# Patient Record
Sex: FEMALE | Race: WHITE | Employment: FULL TIME | URBAN - METROPOLITAN AREA
[De-identification: names, ages, dates, MRNs, and addresses within clinical notes are randomized per-mention and may not be internally consistent; named-entity substitution may affect disease eponyms.]

---

## 2018-04-10 PROCEDURE — 87624 HPV HI-RISK TYP POOLED RSLT: CPT | Performed by: OBSTETRICS & GYNECOLOGY

## 2018-04-10 PROCEDURE — 88175 CYTOPATH C/V AUTO FLUID REDO: CPT | Performed by: OBSTETRICS & GYNECOLOGY

## 2018-04-20 PROBLEM — L65.9 HAIR LOSS: Status: ACTIVE | Noted: 2018-04-20

## 2019-09-04 PROBLEM — Z82.49 FAMILY HISTORY OF EARLY CAD: Status: ACTIVE | Noted: 2019-09-04

## 2021-07-20 PROBLEM — L65.8 FEMALE PATTERN HAIR LOSS: Status: ACTIVE | Noted: 2021-07-20

## 2025-02-25 ENCOUNTER — APPOINTMENT (OUTPATIENT)
Dept: CT IMAGING | Facility: HOSPITAL | Age: 50
End: 2025-02-25
Attending: EMERGENCY MEDICINE
Payer: COMMERCIAL

## 2025-02-25 ENCOUNTER — APPOINTMENT (OUTPATIENT)
Dept: MRI IMAGING | Facility: HOSPITAL | Age: 50
End: 2025-02-25
Attending: EMERGENCY MEDICINE
Payer: COMMERCIAL

## 2025-02-25 ENCOUNTER — HOSPITAL ENCOUNTER (OUTPATIENT)
Facility: HOSPITAL | Age: 50
Setting detail: OBSERVATION
Discharge: HOME | End: 2025-02-26
Attending: EMERGENCY MEDICINE | Admitting: INTERNAL MEDICINE
Payer: COMMERCIAL

## 2025-02-25 DIAGNOSIS — R42 VERTIGO: Primary | ICD-10-CM

## 2025-02-25 PROBLEM — E87.1 HYPONATREMIA: Status: ACTIVE | Noted: 2025-02-25

## 2025-02-25 PROBLEM — E87.6 HYPOKALEMIA: Status: ACTIVE | Noted: 2025-02-25

## 2025-02-25 PROBLEM — R73.9 HYPERGLYCEMIA: Status: ACTIVE | Noted: 2025-02-25

## 2025-02-25 LAB
ALBUMIN SERPL-MCNC: 4.4 G/DL (ref 3.2–4.8)
ALBUMIN/GLOB SERPL: 1.8 {RATIO} (ref 1–2)
ALP LIVER SERPL-CCNC: 35 U/L
ALT SERPL-CCNC: 14 U/L
ANION GAP SERPL CALC-SCNC: 8 MMOL/L (ref 0–18)
AST SERPL-CCNC: 22 U/L (ref ?–34)
ATRIAL RATE: 69 BPM
BASOPHILS # BLD AUTO: 0.04 X10(3) UL (ref 0–0.2)
BASOPHILS NFR BLD AUTO: 0.4 %
BILIRUB SERPL-MCNC: 0.8 MG/DL (ref 0.3–1.2)
BUN BLD-MCNC: 14 MG/DL (ref 9–23)
CALCIUM BLD-MCNC: 9 MG/DL (ref 8.7–10.6)
CHLORIDE SERPL-SCNC: 102 MMOL/L (ref 98–112)
CO2 SERPL-SCNC: 23 MMOL/L (ref 21–32)
CREAT BLD-MCNC: 0.72 MG/DL
EGFRCR SERPLBLD CKD-EPI 2021: 103 ML/MIN/1.73M2 (ref 60–?)
EOSINOPHIL # BLD AUTO: 0.01 X10(3) UL (ref 0–0.7)
EOSINOPHIL NFR BLD AUTO: 0.1 %
ERYTHROCYTE [DISTWIDTH] IN BLOOD BY AUTOMATED COUNT: 11.7 %
GLOBULIN PLAS-MCNC: 2.4 G/DL (ref 2–3.5)
GLUCOSE BLD-MCNC: 171 MG/DL (ref 70–99)
GLUCOSE BLD-MCNC: 178 MG/DL (ref 70–99)
HCT VFR BLD AUTO: 38.2 %
HGB BLD-MCNC: 13.9 G/DL
IMM GRANULOCYTES # BLD AUTO: 0.02 X10(3) UL (ref 0–1)
IMM GRANULOCYTES NFR BLD: 0.2 %
LYMPHOCYTES # BLD AUTO: 1.78 X10(3) UL (ref 1–4)
LYMPHOCYTES NFR BLD AUTO: 17.7 %
MCH RBC QN AUTO: 31.7 PG (ref 26–34)
MCHC RBC AUTO-ENTMCNC: 36.4 G/DL (ref 31–37)
MCV RBC AUTO: 87.2 FL
MONOCYTES # BLD AUTO: 0.3 X10(3) UL (ref 0.1–1)
MONOCYTES NFR BLD AUTO: 3 %
NEUTROPHILS # BLD AUTO: 7.88 X10 (3) UL (ref 1.5–7.7)
NEUTROPHILS # BLD AUTO: 7.88 X10(3) UL (ref 1.5–7.7)
NEUTROPHILS NFR BLD AUTO: 78.6 %
OSMOLALITY SERPL CALC.SUM OF ELEC: 281 MOSM/KG (ref 275–295)
P AXIS: 65 DEGREES
P-R INTERVAL: 130 MS
PLATELET # BLD AUTO: 277 10(3)UL (ref 150–450)
POTASSIUM SERPL-SCNC: 3.5 MMOL/L (ref 3.5–5.1)
PROT SERPL-MCNC: 6.8 G/DL (ref 5.7–8.2)
Q-T INTERVAL: 420 MS
QRS DURATION: 86 MS
QTC CALCULATION (BEZET): 450 MS
R AXIS: 63 DEGREES
RBC # BLD AUTO: 4.38 X10(6)UL
SODIUM SERPL-SCNC: 133 MMOL/L (ref 136–145)
T AXIS: 52 DEGREES
VENTRICULAR RATE: 69 BPM
WBC # BLD AUTO: 10 X10(3) UL (ref 4–11)

## 2025-02-25 PROCEDURE — 70498 CT ANGIOGRAPHY NECK: CPT | Performed by: EMERGENCY MEDICINE

## 2025-02-25 PROCEDURE — 70496 CT ANGIOGRAPHY HEAD: CPT | Performed by: EMERGENCY MEDICINE

## 2025-02-25 PROCEDURE — 70553 MRI BRAIN STEM W/O & W/DYE: CPT | Performed by: EMERGENCY MEDICINE

## 2025-02-25 RX ORDER — DIPHENHYDRAMINE HYDROCHLORIDE 50 MG/ML
10 INJECTION, SOLUTION INTRAMUSCULAR; INTRAVENOUS
Status: COMPLETED | OUTPATIENT
Start: 2025-02-25 | End: 2025-02-25

## 2025-02-25 RX ORDER — DOXEPIN HYDROCHLORIDE 50 MG/1
1 CAPSULE ORAL NIGHTLY
Status: DISCONTINUED | OUTPATIENT
Start: 2025-02-25 | End: 2025-02-26

## 2025-02-25 RX ORDER — ONDANSETRON 2 MG/ML
4 INJECTION INTRAMUSCULAR; INTRAVENOUS EVERY 6 HOURS PRN
Status: DISCONTINUED | OUTPATIENT
Start: 2025-02-25 | End: 2025-02-26

## 2025-02-25 RX ORDER — SODIUM CHLORIDE 9 MG/ML
INJECTION, SOLUTION INTRAVENOUS CONTINUOUS
Status: DISCONTINUED | OUTPATIENT
Start: 2025-02-25 | End: 2025-02-26

## 2025-02-25 RX ORDER — TRETINOIN 1 MG/G
CREAM TOPICAL NIGHTLY
COMMUNITY

## 2025-02-25 RX ORDER — ACETAMINOPHEN 325 MG/1
650 TABLET ORAL EVERY 4 HOURS PRN
Status: DISCONTINUED | OUTPATIENT
Start: 2025-02-25 | End: 2025-02-26

## 2025-02-25 RX ORDER — MECLIZINE HYDROCHLORIDE 25 MG/1
25 TABLET ORAL ONCE
Status: COMPLETED | OUTPATIENT
Start: 2025-02-25 | End: 2025-02-25

## 2025-02-25 RX ORDER — METOCLOPRAMIDE HYDROCHLORIDE 5 MG/ML
10 INJECTION INTRAMUSCULAR; INTRAVENOUS ONCE
Status: COMPLETED | OUTPATIENT
Start: 2025-02-25 | End: 2025-02-25

## 2025-02-25 RX ORDER — ALBUTEROL SULFATE 90 UG/1
2 INHALANT RESPIRATORY (INHALATION) EVERY 4 HOURS PRN
COMMUNITY

## 2025-02-25 RX ORDER — DIAZEPAM 10 MG/2ML
5 INJECTION, SOLUTION INTRAMUSCULAR; INTRAVENOUS ONCE
Status: COMPLETED | OUTPATIENT
Start: 2025-02-25 | End: 2025-02-25

## 2025-02-25 RX ORDER — HYDROCODONE BITARTRATE AND ACETAMINOPHEN 5; 325 MG/1; MG/1
1 TABLET ORAL EVERY 4 HOURS PRN
COMMUNITY
Start: 2025-02-12

## 2025-02-25 RX ORDER — PROCHLORPERAZINE EDISYLATE 5 MG/ML
5 INJECTION INTRAMUSCULAR; INTRAVENOUS EVERY 8 HOURS PRN
Status: DISCONTINUED | OUTPATIENT
Start: 2025-02-25 | End: 2025-02-26

## 2025-02-25 RX ORDER — ACETAMINOPHEN 500 MG
500 TABLET ORAL EVERY 4 HOURS PRN
Status: DISCONTINUED | OUTPATIENT
Start: 2025-02-25 | End: 2025-02-26

## 2025-02-25 RX ORDER — DIAZEPAM 10 MG/2ML
5 INJECTION, SOLUTION INTRAMUSCULAR; INTRAVENOUS EVERY 6 HOURS PRN
Status: DISCONTINUED | OUTPATIENT
Start: 2025-02-25 | End: 2025-02-26

## 2025-02-25 RX ORDER — ASCORBIC ACID 500 MG
500 TABLET ORAL NIGHTLY
COMMUNITY

## 2025-02-25 RX ORDER — MECLIZINE HCL 12.5 MG 12.5 MG/1
25 TABLET ORAL 3 TIMES DAILY PRN
Status: DISCONTINUED | OUTPATIENT
Start: 2025-02-25 | End: 2025-02-26

## 2025-02-25 RX ORDER — ENOXAPARIN SODIUM 100 MG/ML
40 INJECTION SUBCUTANEOUS NIGHTLY
Status: DISCONTINUED | OUTPATIENT
Start: 2025-02-25 | End: 2025-02-26

## 2025-02-25 RX ORDER — SPIRONOLACTONE 25 MG/1
100 TABLET ORAL DAILY
Status: DISCONTINUED | OUTPATIENT
Start: 2025-02-26 | End: 2025-02-26

## 2025-02-25 RX ORDER — HYDROCODONE BITARTRATE AND ACETAMINOPHEN 5; 325 MG/1; MG/1
2 TABLET ORAL EVERY 4 HOURS PRN
Status: DISCONTINUED | OUTPATIENT
Start: 2025-02-25 | End: 2025-02-26

## 2025-02-25 RX ORDER — MINOXIDIL 2.5 MG/1
TABLET ORAL
COMMUNITY
Start: 2024-10-08

## 2025-02-25 RX ORDER — DIPHENHYDRAMINE HYDROCHLORIDE 50 MG/ML
25 INJECTION INTRAMUSCULAR; INTRAVENOUS ONCE
Status: COMPLETED | OUTPATIENT
Start: 2025-02-25 | End: 2025-02-25

## 2025-02-25 RX ORDER — HYDROCODONE BITARTRATE AND ACETAMINOPHEN 5; 325 MG/1; MG/1
1 TABLET ORAL EVERY 4 HOURS PRN
Status: DISCONTINUED | OUTPATIENT
Start: 2025-02-25 | End: 2025-02-26

## 2025-02-25 RX ORDER — ACETAMINOPHEN 500 MG
1000 TABLET ORAL EVERY 8 HOURS PRN
COMMUNITY

## 2025-02-25 NOTE — ED QUICK NOTES
Orders for admission, patient is aware of plan and ready to go upstairs. Any questions, please call ED MONI Crespo at extension [phone].     Patient Covid vaccination status: Fully vaccinated     COVID Test Ordered in ED: None    COVID Suspicion at Admission: N/A    Running Infusions:  None    Mental Status/LOC at time of transport: A&O x4    Other pertinent information:   CIWA score: N/A   NIH score:  0

## 2025-02-25 NOTE — ED PROVIDER NOTES
Patient Seen in: [facility]      History     Chief Complaint   Patient presents with    Dizziness     Stated Complaint: dizzy since 0900 yesterday    Subjective:   HPI      Patient presents with dizziness.  The patient first felt dizzy at 9 AM yesterday.  She was in the bathroom urinating.  It felt like things were spinning but it resolved quickly.  She was fine for the remainder of the day.  However overnight when she got up to go to the bathroom she felt like things were spinning again.  When she woke up this morning she could not get out of bed.  Her mother had vertigo and her  went to pick her up meclizine to try.  She took that without improvement.  She continued to vomit and cannot open her eyes or move at all without severe dizziness.  She does not have a headache.  She was brought in by EMS because her  could not get her into the car.  They report giving her Zofran with slight improvement in the nausea.  She denies any focal weakness or numbness.  She has not had any URI symptoms or ear pain.  She did have recent foot surgery but has done well postoperatively.  She is not having any swelling in the leg and her foot swelling is down.  She did follow-up with her podiatrist to get the sutures removed.    Objective:     Past Medical History:    Perimenopause              Past Surgical History:   Procedure Laterality Date    Excisional biospy right  2011    benign    Foot surgery Right 02/12/2025    \"fixed some loose cartilage\"    Other surgical history  12/15/2011    R Breast Lumpectomy - benign                Social History     Socioeconomic History    Marital status:    Tobacco Use    Smoking status: Never    Smokeless tobacco: Never   Substance and Sexual Activity    Alcohol use: Yes     Alcohol/week: 0.0 standard drinks of alcohol     Comment: 1/week    Drug use: No    Sexual activity: Yes     Partners: Male     Birth control/protection: Pill                   Physical Exam     ED Triage Vitals   BP 02/25/25 1130 118/69   Pulse 02/25/25 1130 69   Resp 02/25/25 1130 16   Temp 02/25/25 1136 98.4 °F (36.9 °C)   Temp src 02/25/25 1136 Oral   SpO2 02/25/25 1130 100 %   O2 Device 02/25/25 1230 None (Room air)       Current Vitals:   Vital Signs  BP: 101/54  Pulse: 80  Resp: 16  Temp: 98.4 °F (36.9 °C)  Temp src: Oral  MAP (mmHg): 69    Oxygen Therapy  SpO2: 100 %  O2 Device: None (Room air)        Physical Exam  General: Lying with eyes closed, answers questions appropriately.  HEENT: Normocephalic, atraumatic, pupils equal round and reactive to light, moderate horizontal nystagmus noted, TMs clear bilaterally.  Neck: Supple.  Cardiovascular: Regular rate and rhythm, no murmurs.  Respiratory: Lungs clear to auscultation.  Extremities: Dressing intact to right foot, no edema in the extremities.  Skin: Warm and dry.  Neurologic: Cranial nerves intact.  Strength 5/5 in all extremities.  Sensory exam grossly intact.    ED Course     Labs Reviewed   CBC WITH DIFFERENTIAL WITH PLATELET - Abnormal; Notable for the following components:       Result Value    Neutrophil Absolute Prelim 7.88 (*)     Neutrophil Absolute 7.88 (*)     All other components within normal limits   COMP METABOLIC PANEL (14) - Abnormal; Notable for the following components:    Glucose 171 (*)     Sodium 133 (*)     Alkaline Phosphatase 35 (*)     All other components within normal limits   POCT GLUCOSE - Abnormal; Notable for the following components:    POC Glucose 178 (*)     All other components within normal limits   RAINBOW DRAW LAVENDER   RAINBOW DRAW LIGHT GREEN   RAINBOW DRAW BLUE     EKG    Rate, intervals and axes as noted on EKG Report.  Rate: 69  Rhythm: Sinus Rhythm  Reading: No acute ischemic abnormality            CTA brain and neck: Pending  MRI brain: Pending    Medications   metoclopramide (Reglan) 5 mg/mL injection 10 mg (10 mg Intravenous Given 2/25/25 1227)   diphenhydrAMINE  (Benadryl) 50 mg/mL  injection 25 mg (25 mg Intravenous Given 2/25/25 1227)   sodium chloride 0.9 % IV bolus 1,000 mL (0 mL Intravenous Stopped 2/25/25 1342)   diazepam (Valium) 5 mg/mL injection 5 mg (5 mg Intravenous Given 2/25/25 1227)   meclizine (Antivert) tab 25 mg (25 mg Oral Given 2/25/25 1342)     The patient was given multiple medications listed above for her dizziness and nausea.  She has improvement in the nausea and some improvement in the dizziness but it is still moderate in severity.  She was unable to get up and ambulate with a steady gait.     MDM      Patient presents with vertigo.  Differential diagnosis includes but is not limited to benign positional vertigo, acute stroke and vertebral artery dissection.  The patient has persistent vertigo symptoms despite multiple medications.  She is still not able to ambulate with a steady gait.  I have discussed her case with Dr. Salcido from neurology.  We will proceed with CT angiogram of the head and neck to rule out vertebral artery dissection/occlusion.  I have an ordered an MRI as well to evaluate for stroke.  The patient will be admitted to Dr. English from the hospitalist service and we have discussed the case as well.    Admission disposition: 2/25/2025  2:40 PM           Medical Decision Making      Disposition and Plan     Clinical Impression:  1. Vertigo         Disposition:  Admit  2/25/2025  2:40 pm    Follow-up:  No follow-up provider specified.        Medications Prescribed:  Current Discharge Medication List              Supplementary Documentation:         Hospital Problems       Present on Admission  Date Reviewed: 10/21/2021            ICD-10-CM Noted POA    * (Principal) Vertigo R42 2/25/2025 Unknown    Hyperglycemia R73.9 2/25/2025 Yes    Hypokalemia E87.6 2/25/2025 Yes    Hyponatremia E87.1 2/25/2025 Yes

## 2025-02-25 NOTE — ED PROVIDER NOTES
Patient was signed out to follow-up on the CTA of the head and neck.      CTA BRAIN + CTA CAROTIDS (CPT=70496/05123)    Result Date: 2/25/2025  PROCEDURE:  CTA BRAIN + CTA CAROTIDS (CPT=70496/24767)  COMPARISON:  None.  INDICATIONS:  dizzy since 0900 yesterday  TECHNIQUE:  CT angiography of the head and neck were obtained with non-ionic contrast.  3D volume rendering images were obtained by the technologist as well as the radiologist on an independent workstation.  Multiplanar 3D reformatted imaging including multiplanar MIP images were obtained.  Curved planar reformats were performed through the carotid and vertebral arteries. All measurements obtained in this exam were performed using NASCET criteria.  Dose reduction techniques were used. Dose information is transmitted to the ACR (American College of Radiology) NRDR (National Radiology Data Registry) which includes the Dose Index Registry.  PATIENT STATED HISTORY:(As transcribed by Technologist)  Patient presents with dizziness and nausea since 9am yesterday.   CONTRAST USED:  75cc of Isovue 370  FINDINGS:  Ventricles and sulci are within normal limits.  There is no midline shift or mass-effect.  The basal cisterns are patent.  The gray-white matter differentiation is intact.  There is no acute intracranial hemorrhage or extra-axial fluid collection.  Mild lucencies in the white matter.  There is no evident fracture.  The visualized paranasal sinuses and mastoid air cells are unremarkable.  The upper cervical, petrous, cavernous, and supraclinoid internal carotid arteries are unremarkable.  An anterior communicating artery is seen.  The branches of the anterior cerebral and middle cerebral arteries are unremarkable.   Infundibulum for the left posterior communicating artery is noted.  The branches of the posterior cerebral and superior cerebellar arteries are unremarkable.  The basilar artery has a normal course and caliber.  The bilateral vertebral arteries are  unremarkable.  Takeoff of right PICA is noted.  There is a 3-vessel aortic arch.  The origins of the branch vessels appear widely patent.  The bilateral subclavian arteries and innominate artery are unremarkable.  The common carotid arteries are widely patent.  The carotid bifurcations appear unremarkable.  There is no evidence of hemodynamically significant stenosis in the carotid bulbs by NASCET criteria.  The cervical internal carotid arteries are widely patent.  Mild patulous prominence of the distal cervical segment of bilateral internal carotid arteries measuring 6 mm in the right and 6 mm in left is noted.  This is of questionable significance.  The vertebral arteries originate from the subclavian arteries.  The origins of the vertebral arteries are patent.  The cervical vertebral arteries are widely patent.              CONCLUSION:   1. Unremarkable CT angiogram head and neck examination.  2. No significant stenosis is noted.  Mild patulous prominence of the distal cervical segments of bilateral internal carotid arteries is of questionable significance.   LOCATION:  [facility]   Dictated by (CST): Shmuel Muñiz MD on 2/25/2025 at 4:38 PM     Finalized by (CST): Shmuel Muñiz MD on 2/25/2025 at 4:44 PM

## 2025-02-25 NOTE — H&P
[facility]  HOSPITALIST  History and Physical     Noelle Barajas Patient Status:  Emergency    1975 MRN [id number]   [facility] EMERGENCY DEPARTMENT Attending Susanna Gaston MD   Hosp Day # 0 PCP Max Cardenas MD     Chief Complaint:   vertigo    History of Present Illness: Noelle Barajas is a 49 year old female with PMH sig for perimenopause - presented to the ED for dizziness. States it started yest at 9 am. She was in the bathroom when it started. Resolved quickly. Overnight it came back. They tried meclizine with no improvement. Was nauseous and vomiting due to dizziness. Having trouble opening her eyes due to dizziness. No FND on EXAM. She had recent food surgery but has been doing well post op. Sodium 133.  CT brain and carotid pending. MRI brain pending. Neuro consulted. Got reglan, valium, benadryl, meclizine in ED with minimal improvement. Admitting for further care and management.    Past Medical History:  Past Medical History:    Perimenopause        Past Surgical History:   Past Surgical History:   Procedure Laterality Date    Excisional biospy right      benign    Foot surgery Right 2025    \"fixed some loose cartilage\"    Other surgical history  12/15/2011    R Breast Lumpectomy - benign       Social History:  reports that she has never smoked. She has never used smokeless tobacco. She reports current alcohol use. She reports that she does not use drugs.    Family History:   Family History   Problem Relation Age of Onset    Hypertension Father     Lipids Father     Heart Disorder Father         CAD - smoker    Hypertension Mother     Lipids Mother     Cancer Maternal Grandmother         cervical cancer    Heart Disorder Maternal Grandfather         CAD    Heart Disorder Paternal Grandfather         CAD        Allergies: Allergies[1]    Medications:  Medications Ordered Prior to Encounter[2]    Review of Systems:   A comprehensive 14 point review of systems  was completed.    Pertinent positives and negatives noted in the HPI.    Physical Exam:    /73   Pulse 80   Temp 98.4 °F (36.9 °C) (Oral)   Resp 16   LMP  (LMP Unknown)   SpO2 100%   General: No acute distress. Alert and oriented x 3.  HEENT: Normocephalic atraumatic. Moist mucous membranes. EOM-I. PERRLA. Anicteric.  Neck: No lymphadenopathy. No JVD. No carotid bruits.  Respiratory: Clear to auscultation bilaterally. No wheezes. No rhonchi.  Cardiovascular: S1, S2. Regular rate and rhythm. No murmurs, rubs or gallops. Equal pulses.   Chest and Back: No tenderness or deformity.  Abdomen: Soft, nontender, nondistended.  Positive bowel sounds. No rebound, guarding or organomegaly.  Neurologic: No focal neurological deficits. CNII-XII grossly intact.  Musculoskeletal: Moves all extremities.  Extremities: No edema or cyanosis.  Integument: No rashes or lesions.   Psychiatric: Appropriate mood and affect.      Diagnostic Data:      Labs:  Recent Labs   Lab 02/25/25  1136   WBC 10.0   HGB 13.9   MCV 87.2   .0       Recent Labs   Lab 02/25/25  1135   *   BUN 14   CREATSERUM 0.72   CA 9.0   ALB 4.4   *   K 3.5      CO2 23.0   ALKPHO 35*   AST 22   ALT 14   BILT 0.8   TP 6.8       CrCl cannot be calculated (Unknown ideal weight.).    No results for input(s): \"PTP\", \"INR\" in the last 168 hours.    COVID-19 Lab Results    COVID-19  No results found for: \"COVID19\"    Pro-Calcitonin  No results for input(s): \"PCT\" in the last 168 hours.    Cardiac  No results for input(s): \"TROP\", \"PBNP\" in the last 168 hours.    Creatinine Kinase  No results for input(s): \"CK\" in the last 168 hours.    Inflammatory Markers  No results for input(s): \"CRP\", \"CHARLENE\", \"LDH\", \"DDIMER\" in the last 168 hours.    No results for input(s): \"TROP\", \"TROPHS\", \"CK\" in the last 168 hours.    Imaging: Imaging data reviewed in Epic.      ASSESSMENT / PLAN:    Noelle CONCEPCION Barajas is a 49 year old female with PMH sig for  perimenopause - presented to the ED for dizziness.     Dizziness - likely 2/2 BPPV  -pt feels somewhat better now  -meclizine prn  -valium prn  -supportive care  -IVF  -CTA reviewed and normal  -MRI brain pending  -PT consult for vestibular therapy  -neuro consulted >> apprec recs  -repeat labs in the morning        Quality:  DVT Prophylaxis: lovenox, scds  CODE status: full code  Rosas: no  If COVID testing is negative, may discontinue isolation: yes     Plan of care discussed with patient and all questions answered.      Ninoska Ferguson Hospitalist  Pager [phone]  Answering Service number: [phone]                            [1]   Allergies  Allergen Reactions    Augmentin [Amoxicillin-Pot Clavulanate] DIARRHEA and NAUSEA ONLY   [2]   No current facility-administered medications on file prior to encounter.     Current Outpatient Medications on File Prior to Encounter   Medication Sig Dispense Refill    minoxidil 2.5 MG Oral Tab Take 1/2 tab po every other day, increase to daily as tolerated. (Patient taking differently: Take 1 tablet (2.5 mg total) by mouth every other day.)      acetaminophen 500 MG Oral Tab Take 2 tablets (1,000 mg total) by mouth every 8 (eight) hours as needed for Pain.      Carboxymethylcellulose Sodium (REFRESH TEARS OP) Place 1 drop into both eyes at bedtime.      Vitamin C 500 MG Oral Tab Take 1 tablet (500 mg total) by mouth at bedtime.      Calcium Carbonate-Vitamin D (CALCIUM 600 + D OR) Take 1 tablet by mouth at bedtime.      ZINC OR Take 1 tablet by mouth at bedtime.      MISC NATURAL PRODUCTS OR Take 1 Scoop by mouth at bedtime. Wheat Grass      Multiple Vitamins-Minerals (ONE-A-DAY WOMENS OR) Take 1 tablet by mouth at bedtime.      Tretinoin 0.1 % External Cream Apply topically nightly.      MISC NATURAL PRODUCTS OR Take 4 capsules by mouth daily. Nutrafol Women      Norethindrone Acet-Ethinyl Est 1-20 MG-MCG Oral Tab Take 1 tablet by mouth daily. One po q day  continuously 112 tablet 4    HYDROcodone-acetaminophen 5-325 MG Oral Tab Take 1 tablet by mouth every 4 (four) hours as needed.      albuterol 108 (90 Base) MCG/ACT Inhalation Aero Soln Inhale 2 puffs into the lungs every 4 (four) hours as needed for Wheezing.      spironolactone 100 MG Oral Tab TAKE 1 TABLET BY MOUTH EVERY DAY 90 tablet 0

## 2025-02-26 VITALS
TEMPERATURE: 98 F | OXYGEN SATURATION: 96 % | HEART RATE: 80 BPM | SYSTOLIC BLOOD PRESSURE: 114 MMHG | BODY MASS INDEX: 20.07 KG/M2 | DIASTOLIC BLOOD PRESSURE: 83 MMHG | WEIGHT: 127.88 LBS | RESPIRATION RATE: 16 BRPM | HEIGHT: 67.01 IN

## 2025-02-26 LAB
ANION GAP SERPL CALC-SCNC: 9 MMOL/L (ref 0–18)
BASOPHILS # BLD AUTO: 0.05 X10(3) UL (ref 0–0.2)
BASOPHILS NFR BLD AUTO: 0.7 %
BUN BLD-MCNC: 10 MG/DL (ref 9–23)
CALCIUM BLD-MCNC: 9.3 MG/DL (ref 8.7–10.6)
CHLORIDE SERPL-SCNC: 107 MMOL/L (ref 98–112)
CO2 SERPL-SCNC: 26 MMOL/L (ref 21–32)
CREAT BLD-MCNC: 0.7 MG/DL
EGFRCR SERPLBLD CKD-EPI 2021: 107 ML/MIN/1.73M2 (ref 60–?)
EOSINOPHIL # BLD AUTO: 0.09 X10(3) UL (ref 0–0.7)
EOSINOPHIL NFR BLD AUTO: 1.2 %
ERYTHROCYTE [DISTWIDTH] IN BLOOD BY AUTOMATED COUNT: 11.9 %
GLUCOSE BLD-MCNC: 94 MG/DL (ref 70–99)
HCT VFR BLD AUTO: 36.7 %
HGB BLD-MCNC: 13 G/DL
IMM GRANULOCYTES # BLD AUTO: 0.03 X10(3) UL (ref 0–1)
IMM GRANULOCYTES NFR BLD: 0.4 %
LYMPHOCYTES # BLD AUTO: 2.18 X10(3) UL (ref 1–4)
LYMPHOCYTES NFR BLD AUTO: 29.7 %
MCH RBC QN AUTO: 32.3 PG (ref 26–34)
MCHC RBC AUTO-ENTMCNC: 35.4 G/DL (ref 31–37)
MCV RBC AUTO: 91.1 FL
MONOCYTES # BLD AUTO: 0.37 X10(3) UL (ref 0.1–1)
MONOCYTES NFR BLD AUTO: 5 %
NEUTROPHILS # BLD AUTO: 4.61 X10 (3) UL (ref 1.5–7.7)
NEUTROPHILS # BLD AUTO: 4.61 X10(3) UL (ref 1.5–7.7)
NEUTROPHILS NFR BLD AUTO: 63 %
OSMOLALITY SERPL CALC.SUM OF ELEC: 293 MOSM/KG (ref 275–295)
PLATELET # BLD AUTO: 251 10(3)UL (ref 150–450)
POTASSIUM SERPL-SCNC: 4.2 MMOL/L (ref 3.5–5.1)
RBC # BLD AUTO: 4.03 X10(6)UL
SODIUM SERPL-SCNC: 142 MMOL/L (ref 136–145)
WBC # BLD AUTO: 7.3 X10(3) UL (ref 4–11)

## 2025-02-26 PROCEDURE — 99223 1ST HOSP IP/OBS HIGH 75: CPT | Performed by: OTHER

## 2025-02-26 RX ORDER — MECLIZINE HYDROCHLORIDE 25 MG/1
25 TABLET ORAL 3 TIMES DAILY PRN
Qty: 30 TABLET | Refills: 1 | Status: SHIPPED | OUTPATIENT
Start: 2025-02-26

## 2025-02-26 NOTE — PLAN OF CARE
NURSING DISCHARGE NOTE    Discharged Home via Wheelchair.  Accompanied by RN  Belongings Taken by patient/family.  IV removed  AVS reviewed  All questions answered

## 2025-02-26 NOTE — PLAN OF CARE
Assumed care at 0730  A/Ox4, RA, VSS  Tele, NSR   Denies n/v & pain   Reg diet  PIV R AC, .9 @ 100mL/hr  Updated w/ POC  All needs met at this time     Problem: SAFETY ADULT - FALL  Goal: Free from fall injury  Description: INTERVENTIONS:  - Assess pt frequently for physical needs  - Identify cognitive and physical deficits and behaviors that affect risk of falls.  - [place] fall precautions as indicated by assessment.  - Educate pt/family on patient safety including physical limitations  - Instruct pt to call for assistance with activity based on assessment  - Modify environment to reduce risk of injury  - Provide assistive devices as appropriate  - Consider OT/PT consult to assist with strengthening/mobility  - Encourage toileting schedule  Outcome: Progressing     Problem: Patient/Family Goals  Goal: Patient/Family Long Term Goal  Description: Patient's Long Term Goal: dc home    Interventions:  - neurology consult  - See additional Care Plan goals for specific interventions  Outcome: Progressing  Goal: Patient/Family Short Term Goal  Description: Patient's Short Term Goal:     Interventions:   -   - See additional Care Plan goals for specific interventions  Outcome: Progressing

## 2025-02-26 NOTE — PROGRESS NOTES
[facility]   part of [facility] Hospitalist Progress Note     Noelle Barajas Patient Status:  Observation    1975 MRN [id number]   Location [facility] 3NE-A Attending Ninoska English MD   Hosp Day # 0 PCP Max Cardenas MD     Subjective:     Patient seen and examined.   Imaging reviewed  Feels less dizzy  Feels better  Afebrile      Objective:    Review of Systems:   10 point ROS completed and was negative, except for pertinent positive and negatives stated in subjective.    Vital signs:  Temp:  [97.8 °F (36.6 °C)-98.6 °F (37 °C)] 98.1 °F (36.7 °C)  Pulse:  [69-80] 72  Resp:  [14-20] 16  BP: ()/(54-73) 118/70  SpO2:  [96 %-100 %] 98 %    Physical Exam:    General: No acute distress.   HEENT:  EOMI, PERRLA, OP clear  Respiratory: Clear to auscultation bilaterally. No wheezes. No rhonchi.  Cardiovascular: S1, S2. Regular rate and rhythm. No murmurs.  Abdomen: Soft, nontender, nondistended.  Positive bowel sounds. No rebound or guarding.  Extremities: No edema.  Neuro:  Grossly non focal, no motor deficits.        Diagnostic Data:    Labs:  Recent Labs   Lab 25  1136   WBC 10.0   HGB 13.9   MCV 87.2   .0       Recent Labs   Lab 25  1135   *   BUN 14   CREATSERUM 0.72   CA 9.0   ALB 4.4   *   K 3.5      CO2 23.0   ALKPHO 35*   AST 22   ALT 14   BILT 0.8   TP 6.8       Estimated Creatinine Clearance: 87.5 mL/min (based on SCr of 0.72 mg/dL).    No results for input(s): \"PTP\", \"INR\" in the last 168 hours.         COVID-19 Lab Results    COVID-19  No results found for: \"COVID19\"    Pro-Calcitonin  No results for input(s): \"PCT\" in the last 168 hours.    Cardiac  No results for input(s): \"TROP\", \"PBNP\" in the last 168 hours.    Creatinine Kinase  No results for input(s): \"CK\" in the last 168 hours.    Inflammatory Markers  No results for input(s): \"CRP\", \"CHARLENE\", \"LDH\", \"DDIMER\" in the last 168 hours.    Imaging: Imaging data  reviewed in Epic.    Medications:    enoxaparin  40 mg Subcutaneous Nightly    spironolactone  100 mg Oral Daily    multivitamin  1 tablet Oral Nightly       Assessment & Plan:    Noelle Barajas is a 49 year old female with PMH sig for perimenopause - presented to the ED for dizziness.      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        Ninoska English MD  [facility] Hospitalist  Pager [phone]  Answering Service number: [phone]

## 2025-02-26 NOTE — PROGRESS NOTES
02/26/25 1407 02/26/25 1408 02/26/25 1409   Vitals   /77 132/83 114/83   MAP (mmHg) 92 93 95   BP Location Left arm Left arm Left arm   BP Method Manual Manual Manual   Patient Position Lying Sitting Standing
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
## 2025-02-26 NOTE — PHYSICAL THERAPY NOTE
PHYSICAL THERAPY EVALUATION - INPATIENT     Room Number: 3604/3604-A  Evaluation Date: 2025  Type of Evaluation: Initial  Physician Order: PT Eval and Treat    Presenting Problem: Vertigo  Co-Morbidities : Hyponatermia, Hypokalemia, Hyperglycemia  Reason for Therapy: Mobility Dysfunction and Discharge Planning    PHYSICAL THERAPY ASSESSMENT   Patient is a 49 year old female admitted 2025 for vertigo.  Prior to admission, patient's baseline is IND.  Patient is currently functioning at baseline with bed mobility, transfers, and gait.  Pt has no IP PT goals at this time    Patient will benefit from continued skilled PT Services for duration of hospitalization, however, given the patient is functioning near baseline level do not anticipate skilled therapy needs at discharge .    PLAN DURING HOSPITALIZATION  Nursing Mobility Recommendation : 1 Assist  PT Device Recommendation: None              CURRENT GOALS    Patient has met all skilled IPPT goals at this time. Patient will be discharged from Physical Therapy services.  Please re-order if a new functional limitation presents during this admission.        PHYSICAL THERAPY MEDICAL/SOCIAL HISTORY  History related to current admission: Patient is a 49 year old female admitted on 2025 from Home for Vertigo.      HOME SITUATION  Type of Home: House  Home Layout: Two level           Stairs to Bedroom: 12    Railing: Yes    Lives With: Spouse    Drives: Yes   Patient Regularly Uses: None      Prior Level of [place]: Pt states that she is IND with all ADLs and Gait    SUBJECTIVE  \"I feel better compared to yesterday\"    OBJECTIVE  Precautions: None  Fall Risk: Standard fall risk    WEIGHT BEARING RESTRICTION     PAIN ASSESSMENT  Ratin  Location: Pt reports no pain       COGNITION  Overall Cognitive Status:  WFL - within functional limits    RANGE OF MOTION AND STRENGTH ASSESSMENT  Upper extremity ROM and strength are within functional limits     Lower  extremity ROM is within functional limits     Lower extremity strength is within functional limits     BALANCE  Static Sitting: Normal  Dynamic Sitting: Normal  Static Standing: Normal  Dynamic Standing: Normal    ADDITIONAL TESTS                                    ACTIVITY TOLERANCE                         O2 WALK       NEUROLOGICAL FINDINGS                        AM-PAC '6-Clicks' INPATIENT SHORT FORM - BASIC MOBILITY  How much difficulty does the patient currently have...  Patient Difficulty: Turning over in bed (including adjusting bedclothes, sheets and blankets)?: None   Patient Difficulty: Sitting down on and standing up from a chair with arms (e.g., wheelchair, bedside commode, etc.): None   Patient Difficulty: Moving from lying on back to sitting on the side of the bed?: None   How much help from another person does the patient currently need...   Help from Another: Moving to and from a bed to a chair (including a wheelchair)?: None   Help from Another: Need to walk in hospital room?: A Little   Help from Another: Climbing 3-5 steps with a railing?: A Little     AM-PAC Score:  Raw Score: 22   Approx Degree of Impairment: 20.91%   Standardized Score (AM-PAC Scale): 53.28   CMS Modifier (G-Code): CJ    FUNCTIONAL ABILITY STATUS  Gait Assessment   Functional Mobility/Gait Assessment  Gait Assistance: Not tested    Skilled Therapy Provided     Bed Mobility:  Rolling: NT  Supine to sit: IND   Sit to supine: IND     Transfer Mobility:  Sit to stand: IND   Stand to sit: IND  Gait = NT    Therapist's Comments: Pt's BP supine 130/75, sitting 130/98, standing 130/103    Performed jose halpike and apley maneuver with pt. Pt was observed with nystagmus on both sides. Pt did not reports any increase in intensity dizziness during the maneuvers. While staring pt continues to demonstrate slight nystagmus while gazing. Educated pt on compensatory techniques while experiencing dizziness.       Exercise/Education  Provided:  Bed mobility  Body mechanics  Gait training  Transfer training    Patient End of Session: Up in chair;Needs met;Call light within reach;RN aware of session/findings;All patient questions and concerns addressed      Patient Evaluation Complexity Level:  History Moderate - 1 or 2 personal factors and/or co-morbidities   Examination of body systems Low -  addressing 1-2 elements   Clinical Presentation Low- Stable   Clinical Decision Making Low Complexity       PT Session Time: 23 minutes  Therapeutic Activity: 15 minutes

## 2025-02-26 NOTE — PLAN OF CARE
Assumed care at 1930. A/Ox4, on room air, NSR on telemetry. Regular diet. Ambulates, voids. Lovenox for VTE. PIV infusing 0.9 nacl at 100 ml/hr. Patient updated with plan of care. All needs met at this time.      Problem: SAFETY ADULT - FALL  Goal: Free from fall injury  Description: INTERVENTIONS:  - Assess pt frequently for physical needs  - Identify cognitive and physical deficits and behaviors that affect risk of falls.  - [place] fall precautions as indicated by assessment.  - Educate pt/family on patient safety including physical limitations  - Instruct pt to call for assistance with activity based on assessment  - Modify environment to reduce risk of injury  - Provide assistive devices as appropriate  - Consider OT/PT consult to assist with strengthening/mobility  - Encourage toileting schedule  Outcome: Progressing